# Patient Record
Sex: MALE | HISPANIC OR LATINO | Employment: FULL TIME | URBAN - METROPOLITAN AREA
[De-identification: names, ages, dates, MRNs, and addresses within clinical notes are randomized per-mention and may not be internally consistent; named-entity substitution may affect disease eponyms.]

---

## 2018-04-07 ENCOUNTER — NON-PROVIDER VISIT (OUTPATIENT)
Dept: URGENT CARE | Facility: CLINIC | Age: 28
End: 2018-04-07

## 2018-04-07 DIAGNOSIS — Z02.1 PRE-EMPLOYMENT DRUG SCREENING: ICD-10-CM

## 2018-04-07 LAB
AMP AMPHETAMINE: NORMAL
BAR BARBITURATES: NORMAL
BZO BENZODIAZEPINES: NORMAL
COC COCAINE: NORMAL
INT CON NEG: NORMAL
INT CON POS: NORMAL
MDMA ECSTASY: NORMAL
MET METHAMPHETAMINES: NORMAL
MTD METHADONE: NORMAL
OPI OPIATES: NORMAL
OXY OXYCODONE: NORMAL
PCP PHENCYCLIDINE: NORMAL
POC URINE DRUG SCREEN OCDRS: NEGATIVE
THC: NORMAL

## 2018-04-07 PROCEDURE — 80305 DRUG TEST PRSMV DIR OPT OBS: CPT | Performed by: FAMILY MEDICINE

## 2018-05-11 ENCOUNTER — OFFICE VISIT (OUTPATIENT)
Dept: URGENT CARE | Facility: CLINIC | Age: 28
End: 2018-05-11
Payer: COMMERCIAL

## 2018-05-11 VITALS
HEART RATE: 74 BPM | HEIGHT: 66 IN | TEMPERATURE: 98.1 F | WEIGHT: 174.4 LBS | OXYGEN SATURATION: 97 % | DIASTOLIC BLOOD PRESSURE: 94 MMHG | SYSTOLIC BLOOD PRESSURE: 122 MMHG | BODY MASS INDEX: 28.03 KG/M2

## 2018-05-11 DIAGNOSIS — M67.89 EXTENSOR TENDON DISRUPTION: ICD-10-CM

## 2018-05-11 PROCEDURE — 99202 OFFICE O/P NEW SF 15 MIN: CPT | Performed by: NURSE PRACTITIONER

## 2018-05-11 ASSESSMENT — ENCOUNTER SYMPTOMS
FEVER: 0
CHILLS: 0

## 2018-05-11 NOTE — PROGRESS NOTES
Subjective:      Abhilash Philip is a 27 y.o. male who presents with Finger Injury (left middle finger laceration X 2 days/ finger is bent today )    History reviewed. No pertinent past medical history.  Social History     Social History   • Marital status: Single     Spouse name: N/A   • Number of children: N/A   • Years of education: N/A     Occupational History   • Not on file.     Social History Main Topics   • Smoking status: Never Smoker   • Smokeless tobacco: Never Used   • Alcohol use No   • Drug use: No   • Sexual activity: Not on file     Other Topics Concern   • Not on file     Social History Narrative    ** Merged History Encounter **          Mr. Philip had no medications administered during this visit.    Allergies: Patient has no known allergies.    Patient is a 27-year-old male who presents today with complaint of laceration over the DIP joint of the middle finger, dorsal aspect. This injury occurred 2 days ago when he was working with a metal cylinder at home. He states the object slipped and cut his finger. He treated this at home. He states earlier today, approximately 20 minutes prior to coming to urgent care he was cracking his knuckles and felt a pop over the distal aspect of his finger. After this occurred, the distal aspect of his finger was curved inward and he could not straighten it. That continues at this time. No other injuries.          Other   This is a new problem. Episode onset: 2 days ago. The problem occurs constantly. The problem has been unchanged. Pertinent negatives include no chills or fever. Nothing aggravates the symptoms. He has tried nothing for the symptoms. The treatment provided no relief.       Review of Systems   Constitutional: Negative for chills and fever.   Musculoskeletal:        Unable to extend the left middle finger at the DIP joint, small laceration over the DIP joint   All other systems reviewed and are negative.         Objective:     /94   Pulse 74  "  Temp 36.7 °C (98.1 °F)   Ht 1.676 m (5' 6\")   Wt 79.1 kg (174 lb 6.4 oz)   SpO2 97%   BMI 28.15 kg/m²      Physical Exam   Constitutional: He appears well-developed and well-nourished.   Musculoskeletal:        Hands:  There is a 0.5 cm laceration over the DIP joint of the dorsal left middle finger. The finger is flexed from the DIP joint and patient cannot extend it. No redness, swelling or drainage noted.   Skin: Skin is warm and dry. Capillary refill takes less than 2 seconds.   Psychiatric: He has a normal mood and affect. His behavior is normal. Judgment and thought content normal.   Vitals reviewed.              Assessment/Plan:     1. Extensor tendon disruption  -patient splinted  -referral given to Dr. Gamez at [facility] for follow up.   -Tdap is current.       "